# Patient Record
(demographics unavailable — no encounter records)

---

## 2024-10-17 NOTE — HISTORY OF PRESENT ILLNESS
[de-identified] : 64 year old RHD female with PMHx of HTN, presents for initial evaluation, c/o left hip pain x 6 months. Patient reports acute onset of constant left groin pain, radiating to the left hip, described as "aching" and rated 7/10 on the pain scale. Patient states that pain has been persistent, despite cortisone injection x 2 with Dr. Jose Daniel Thomas at [facility]. Patient denies any recent falls, trauma, or other injuries precipitating the onset of her hip pain. Patient denies any numbness, tingling or weakness to LLE. Patient reports history of lumbar stenosis, s/p lumbar nerve block on 07/12/2024 with Dr. Mian Dash. Patient is not participating in PT at this time. Patient otherwise states that she has been in her usual state of health. Patient ambulates without assistance and can perform ADL's independently. Patient denies any fever, chills, headache, dizziness, shortness of breath, chest pain, palpitations, abdominal pain, new onset urinary/bowel incontinence, saddle paresthesia, or other acute complaints at this time.  She did have a left total knee replacement apparently with multiple revisions a number of years ago.

## 2024-10-17 NOTE — PHYSICAL EXAM
[de-identified] : Constitutional o Appearance : well-nourished, well developed, alert, in no acute distress  Head and Face o Head :  Inspection : atraumatic, normocephalic o Face :  Inspection : no visible rash or discoloration Respiratory o Respiratory Effort: breathing unlabored  Neurologic o Mental Status Examination :  Orientation : alert and oriented X 3 Psychiatric o Mood and Affect: mood normal, affect appropriate Cardiovascular o Observation/Palpation : no swelling Lymphatic o Additional Nodes : No palpable lymph nodes present  Lumbosacral Spine o Inspection : no visible rash or discoloration o Palpation : no paraspinal musculature tenderness o Range of Motion : arc of motion full in all planes, no crepitance or pain with ROM o Muscle Strength : paraspinal muscle strength and tone within normal limits o Muscle Tone : paraspinal muscle strength and tone within normal limits o Tests: straight leg test negative bilaterally, BRANDI test negative bilaterally   Right Lower Extremity o Buttock : no tenderness, swelling or deformities o Right Hip :  Inspection/Palpation : no tenderness, no swelling or deformities  Range of Motion : full flexion and external rotation limited no crepitance  Stability : joint stability intact  Strength : extension, flexion, adduction, abduction, internal rotation and external rotation 5/5   Tests: Obers test negative  Left Lower Extremity o Buttock : no tenderness, swelling or deformities  o Left Hip :  Inspection/Palpation : no tenderness, no swelling or deformities  Range of Motion : full flexion and painful and limited external rotation, no crepitance  Stability : joint stability intact  Strength : extension, flexion, adduction, abduction, internal rotation and external rotation 3/5 limited by pain  Tests: Obers test negative  o Left Knee :  Inspection/Palpation : no tenderness to palpation, no swelling  Range of Motion : 0-120active flexion and extension full and painless, no crepitance  Stability : no valgus or varus instability present on provocative testing  Strength : flexion and extension 5/5  Tests and Signs : negative Anterior Drawer, negative Lachman, negative Rupesh's test   Gait: abductor lurch on the left, limited core strength, no significant extremity swelling or lymphedema, good proprioception and balance  Radiology Results 10/17/2024 o Hip and Pelvis : AP pelvis was obtained and reveals moderate to severe degenerative arthritis left side worse than right

## 2024-10-17 NOTE — DISCUSSION/SUMMARY
[de-identified] : I went over the pathophysiology of the patient's symptoms in great detail with the patient. I discussed the underlying pathophysiology of the patient's condition in great detail with the patient. I went over the patient's x-rays with them in great detail. I informed the patient that surgery, a total hip replacement, will be required to provide them long term relief from their symptoms. Patient understands she needs to consider hip replacement given she is losing motion and conservative measures are not providing enough relief. I informed her that I no longer perform hip replacements, and she was provided with the names of my partners Dr. Watters and Dr. Allen. At this time, she will start a course of physical therapy for strengthening and flexibility. A prescription was provided. I am referring the patient to a Radiologist specialist in order to provide her with longer term relief. I informed the patient that a left intra articular injection will be required to provide them longer term relief from their symptoms. We had a lengthy discussion about the patient's issues and talked about the benefits and risks of the injections. A referral was provided. Proper cane walking protocol was discussed and demonstrated with the patient.   All of their questions were answered. They understand and consent to the plan.   FU two weeks after the injections.

## 2024-10-17 NOTE — ADDENDUM
[FreeTextEntry1] : I, ANGE MCLEOD, acted solely as a scribe for Dr. Joey Ley on this date 10/17/2024.  All medical record entries made by the Scribe were at my, Dr. Joey Ley, direction and personally dictated by me on 10/17/2024. I have reviewed the chart and agree that the record accurately reflects my personal performance of the history, physical exam, assessment and plan. I have also personally directed, reviewed, and agreed with the chart.

## 2024-11-14 NOTE — PHYSICAL EXAM
[de-identified] : Constitutional o Appearance : well-nourished, well developed, alert, in no acute distress  Head and Face o Head :  Inspection : atraumatic, normocephalic o Face :  Inspection : no visible rash or discoloration Respiratory o Respiratory Effort: breathing unlabored  Neurologic o Mental Status Examination :  Orientation : alert and oriented X 3 Psychiatric o Mood and Affect: mood normal, affect appropriate Cardiovascular o Observation/Palpation : no swelling Lymphatic o Additional Nodes : No palpable lymph nodes present  Lumbosacral Spine o Inspection : no visible rash or discoloration o Palpation : no paraspinal musculature tenderness o Range of Motion : arc of motion full in all planes, no crepitance or pain with ROM o Muscle Strength : paraspinal muscle strength and tone within normal limits o Muscle Tone : paraspinal muscle strength and tone within normal limits o Tests: straight leg test negative bilaterally, BRANDI test negative bilaterally   Right Lower Extremity o Buttock : no tenderness, swelling or deformities o Right Hip :  Inspection/Palpation : no tenderness, no swelling or deformities  Range of Motion : full flexion and external rotation limited no crepitance  Stability : joint stability intact  Strength : extension, flexion, adduction, abduction, internal rotation and external rotation 5/5   Tests: Obers test negative  Left Lower Extremity o Buttock : no tenderness, swelling or deformities  o Left Hip :  Inspection/Palpation : no tenderness, no swelling or deformities  Range of Motion : full flexion and painful and limited external rotation, no crepitance  Stability : joint stability intact  Strength : extension, flexion, adduction, abduction, internal rotation and external rotation 3/5 limited by pain  Tests: Obers test negative  o Left Knee :  Inspection/Palpation : no tenderness to palpation, no swelling  Range of Motion : 0-120active flexion and extension full and painless, no crepitance  Stability : no valgus or varus instability present on provocative testing  Strength : flexion and extension 3/5  Tests and Signs : negative Anterior Drawer, negative Lachman, negative Rupesh's test   Gait: abductor lurch on the left, limited core strength, no significant extremity swelling or lymphedema, good proprioception and balance

## 2024-11-14 NOTE — ADDENDUM
[FreeTextEntry1] : I, ANGE MCLEOD, acted solely as a scribe for Dr. Joey Ley on this date 11/14/2024.  All medical record entries made by the Scribe were at my, Dr. Joey Ley, direction and personally dictated by me on 11/14/2024. I have reviewed the chart and agree that the record accurately reflects my personal performance of the history, physical exam, assessment and plan. I have also personally directed, reviewed, and agreed with the chart.

## 2024-11-14 NOTE — DISCUSSION/SUMMARY
[de-identified] : I went over the pathophysiology of the patient's symptoms in great detail with the patient. I discussed the underlying pathophysiology of the patient's condition in great detail with the patient. I went over the patient's x-rays with them in great detail. We are requesting authorization for an MRI of the patients left hip. I am recommending the patient continues to go to physical therapy to obtain increases in their strength and mobility. A prescription was provided. Proper cane walking protocol was discussed and demonstrated with the patient.   All of their questions were answered. They understand and consent to the plan.   FU after MRI.

## 2024-11-14 NOTE — HISTORY OF PRESENT ILLNESS
[de-identified] : 64 year old RHD female with PMHx of HTN, presents for a follow-up evaluation, c/o left hip pain x 6 months. She had a left hip intra articular injection 2-3 weeks and reports relief the first day. She states she started physical therapy on 10/21/2024 and notes she has good and bad days. She does not have a recent MRI of her left hip. Patient denies that she has any numbness or tingling. She states she was given at home exercises as well. She denies having problems putting socks and shoes on the right side. Patient presents ambulating with a cane in the right hand.   PROCEDURE DATE:  10/21/2024 INTERPRETATION:  Fluoroscopic -guided left hip joint injection  History: Left hip pain. IMPRESSION:  Successful fluoroscopic-guided therapeutic injection of the left hip as described above.  --- End of Report ---  Radiology Results 10/17/2024 o Hip and Pelvis : AP pelvis was obtained and reveals moderate to severe degenerative arthritis left side worse than right

## 2024-12-04 NOTE — DISCUSSION/SUMMARY
[de-identified] : I went over the pathophysiology of the patient's symptoms in great detail with the patient. I went over the patient's MRI results with them in great detail and used models to aid in my explanation. Proper cane walking protocol was discussed and demonstrated with the patient. I informed the patient that surgery, a total hip replacement, will be required to provide them long term relief from their symptoms. Patient understands she needs to consider hip replacement given she is losing motion and conservative measures are not providing enough relief. At-home strengthening exercises were discussed and demonstrated with the patient. She will stop physical therapy at this time.  We discussed the use of Voltaren gel at this time. Instructions were discussed with the patient. We are prescribing Diclofenac at this time. A prescription was provided.  A long discussion about a total hip replacement was undertaken.  She does understand at some point she will need a left hip replacement and she is in the appropriate age.  All of their questions were answered. They understand and consent to the plan.   FU in 6 weeks.

## 2024-12-04 NOTE — HISTORY OF PRESENT ILLNESS
[de-identified] : 64 year old RHD female with PMHx of HTN, presents for a left hip MRI review today. I went over the patient's MRI results with them in great detail and used models to aid in my explanation. She had a left hip intra articular injection a month ago and reports relief the first day. and the pain has returned.  She states she started physical therapy on 10/21/2024 and notes she has good and bad days. Patient denies that she has any numbness or tingling. She states the average discomfort is a 6 out of 10. Patient presents ambulating with a cane in the right hand. She states she is able to walk. She states she is taking Aspirin for the pain.   [facility] MRI LEFT HIP done on 11/20/2024 IMPRESSION: 1.  Advanced left hip arthrosis most notably superiorly and anterior superiorly. 2.  Small hip joint effusion and mild trochanteric bursitis.  --- End of Report ---   PROCEDURE DATE:  10/21/2024 INTERPRETATION:  Fluoroscopic -guided left hip joint injection  History: Left hip pain. IMPRESSION:  Successful fluoroscopic-guided therapeutic injection of the left hip as described above.  --- End of Report ---  Radiology Results 10/17/2024 o Hip and Pelvis : AP pelvis was obtained and reveals moderate to severe degenerative arthritis left side worse than right

## 2024-12-04 NOTE — PHYSICAL EXAM
[de-identified] : Constitutional o Appearance : well-nourished, well developed, alert, in no acute distress  Head and Face o Head :  Inspection : atraumatic, normocephalic o Face :  Inspection : no visible rash or discoloration Respiratory o Respiratory Effort: breathing unlabored  Neurologic o Mental Status Examination :  Orientation : alert and oriented X 3 Psychiatric o Mood and Affect: mood normal, affect appropriate Cardiovascular o Observation/Palpation : no swelling Lymphatic o Additional Nodes : No palpable lymph nodes present  Lumbosacral Spine o Inspection : no visible rash or discoloration o Palpation : no paraspinal musculature tenderness o Range of Motion : arc of motion full in all planes, no crepitance or pain with ROM o Muscle Strength : paraspinal muscle strength and tone within normal limits o Muscle Tone : paraspinal muscle strength and tone within normal limits o Tests: straight leg test negative bilaterally, BRANDI test negative bilaterally   Right Lower Extremity o Buttock : no tenderness, swelling or deformities o Right Hip :  Inspection/Palpation : no tenderness, no swelling or deformities  Range of Motion : full flexion and external rotation limited no crepitance  Stability : joint stability intact  Strength : extension, flexion, adduction, abduction, internal rotation and external rotation 5/5   Tests: Obers test negative  Left Lower Extremity o Buttock : no tenderness, swelling or deformities  o Left Hip :  Inspection/Palpation : no tenderness, no swelling or deformities  Range of Motion : full flexion and painful and limited external rotation, no crepitance  Stability : joint stability intact  Strength : extension, flexion, adduction, abduction, internal rotation and external rotation 3/5 limited by pain  Tests: Obers test negative  o Left Knee :  Inspection/Palpation : no tenderness to palpation, no swelling  Range of Motion : 0-120active flexion and extension full and painless, no crepitance  Stability : no valgus or varus instability present on provocative testing  Strength : flexion and extension 3/5  Tests and Signs : negative Anterior Drawer, negative Lachman, negative Rupesh's test   Gait: minimal  abductor lurch on the left, limited core strength, no significant extremity swelling or lymphedema, good proprioception and balance

## 2024-12-04 NOTE — ADDENDUM
[FreeTextEntry1] : I, ANGE MCLEOD, acted solely as a scribe for Dr. Joey Ley on this date 12/04/2024.  All medical record entries made by the Scribe were at my, Dr. Joey Ley, direction and personally dictated by me on 12/04/2024. I have reviewed the chart and agree that the record accurately reflects my personal performance of the history, physical exam, assessment and plan. I have also personally directed, reviewed, and agreed with the chart.

## 2025-01-21 NOTE — DISCUSSION/SUMMARY
[de-identified] : I went over the pathophysiology of the patient's symptoms in great detail with the patient. I discussed the underlying pathophysiology of the patient's condition in great detail with the patient. I went over the patient's x-rays with them in great detail. At this time, she will start a course of physical therapy for strengthening and flexibility. A prescription was provided. At-home strengthening exercises were discussed and demonstrated with the patient. She should focus on light weight and high repetition exercises. We are prescribing 5% lidocaine patches at this time.   All of their questions were answered. They understand and consent to the plan.   FU in 4-6 weeks.

## 2025-01-21 NOTE — HISTORY OF PRESENT ILLNESS
[de-identified] : 64 year old RHD female with PMHx of HTN, presents for a left hip follow-up evaluation. She had select nerve root blocks and epidurals for her lower back which are not helping anymore. She is getting an intercept procedure for her lower back. She had a left hip intra articular injection a month ago and reports relief the first day. and the pain has returned.  She states she started physical therapy on 10/21/2024 and notes she has good and bad days. Patient denies that she has any numbness or tingling. She states the average discomfort is a 6 out of 10. Patient presents ambulating with a cane in the right hand. She states she is able to walk. She states she is taking Diclofenac to help manage pain. She does have difficultly tying her shoes. She states when her lower back flares up it aggravates her left hip pain. She is not in physical therapy at this time.    [facility] MRI LEFT HIP done on 11/20/2024 IMPRESSION: 1.  Advanced left hip arthrosis most notably superiorly and anterior superiorly. 2.  Small hip joint effusion and mild trochanteric bursitis.  --- End of Report ---   PROCEDURE DATE:  10/21/2024 INTERPRETATION:  Fluoroscopic -guided left hip joint injection  History: Left hip pain. IMPRESSION:  Successful fluoroscopic-guided therapeutic injection of the left hip as described above.  --- End of Report ---  Radiology Results 10/17/2024 o Hip and Pelvis : AP pelvis was obtained and reveals moderate to severe degenerative arthritis left side worse than right

## 2025-01-21 NOTE — ADDENDUM
[FreeTextEntry1] : I, ANGE MCLEOD, acted solely as a scribe for Dr. Joey Ley on this date 01/21/2025.  All medical record entries made by the Scribe were at my, Dr. Joey Ley, direction and personally dictated by me on 01/21/2025. I have reviewed the chart and agree that the record accurately reflects my personal performance of the history, physical exam, assessment and plan. I have also personally directed, reviewed, and agreed with the chart.

## 2025-03-13 NOTE — HISTORY OF PRESENT ILLNESS
[FreeTextEntry1] : 64 W who is here for initial consultation of new onset headache. It started 1 month ago when she started taking a supplement called Moringa. A quick search on pubmed shows that it may decrease blood pressure. Her blood pressure now is not hypotensive but she has the headache more when she is in bed at night.  quality: pressure location: right and front of her head frequency nightly and upon awakening duration all night.  Her Mimvey was started for her hot flashes and it may cause headaches as per [email or web address]. If the headache continues, she will consult with her gyn on possibly changing this to something else.

## 2025-03-13 NOTE — DISCUSSION/SUMMARY
[FreeTextEntry1] : 64 W who is here for initial consultation of new onset headache that occurs at night and morning. Will check mri of brain to evaluate for any structural causes of her headache. She will hold Moringa as the start of this supplement coincides with her headache onset. If it continues, the mimvey may also be causing headache as a side effect. She will keep me posted and will followup after the mri is completed.  I spent the time noted on the day of this patient encounter preparing for, review of medical records,review of pertinent diagnostic studies, providing and documenting the above E/M service and counseling and educate patient on differential, workup, disease course, and treatment/management. Education was provided to the patient during this encounter. All questions and concerns were answered and addressed in detail. The patient verbalized understanding and agreed to plan. Patient was advised to continue to monitor for neurologic symptoms and to notify my office or go to the nearest emergency room if there are any changes. Any orders/referrals and communications were provided as well. Side effects of the above medications were discussed in detail including but not limited to applicable black box warning and teratogenicity as appropriate. Patient was advised to bring previous records to my office. A copy of the consult note will be sent to the referring physician.

## 2025-05-13 NOTE — HISTORY OF PRESENT ILLNESS
[de-identified] : 64 year old RHD female with PMHx of HTN, presents for a left hip follow-up evaluation. She had selected nerve root blocks and epidurals for her lower back which are not helping anymore. She had a left hip intra articular injection a month ago and reports relief the first day and the pain did return.  She states she started physical therapy on 10/21/2024 and notes she has improved her strength. Patient denies that she has any numbness or tingling. Patient presents ambulating with a cane in the right hand. She states she is able to walk. She states she is taking Diclofenac to help manage pain but notes it's no longer working. She does have difficultly tying her shoes. She states she is miserable and is ready for surgery. She continues to have difficulty putting socks and shoes.   [facility] MRI LEFT HIP done on 11/20/2024 IMPRESSION: 1.  Advanced left hip arthrosis most notably superiorly and anterior superiorly. 2.  Small hip joint effusion and mild trochanteric bursitis.  --- End of Report ---   PROCEDURE DATE:  10/21/2024 INTERPRETATION:  Fluoroscopic -guided left hip joint injection  History: Left hip pain. IMPRESSION:  Successful fluoroscopic-guided therapeutic injection of the left hip as described above.  --- End of Report ---  Radiology Results 10/17/2024 o Hip and Pelvis : AP pelvis was obtained and reveals moderate to severe degenerative arthritis left side worse than right

## 2025-05-13 NOTE — ADDENDUM
[FreeTextEntry1] : I, ANGE MCLEOD, acted solely as a scribe for Dr. Joey Ley on this date 05/13/2025.  All medical record entries made by the Scribe were at my, Dr. Joey Ley, direction and personally dictated by me on 05/13/2025. I have reviewed the chart and agree that the record accurately reflects my personal performance of the history, physical exam, assessment and plan. I have also personally directed, reviewed, and agreed with the chart.

## 2025-05-13 NOTE — DISCUSSION/SUMMARY
[de-identified] : I went over the pathophysiology of the patient's symptoms in great detail with the patient. I informed the patient that surgery, a total hip replacement, will be required to provide them long term relief from their symptoms. Patient understands she needs to consider hip replacement given she is losing motion and conservative measures are not providing enough relief. I informed her that I no longer perform hip replacements, and she was provided with the names of my partners Dr. Watters, Dr. Brito and Dr. Allen.   All of their questions were answered. They understand and consent to the plan.   FU with Dr. Watters, Dr. Brito or Dr. Allen

## 2025-05-13 NOTE — PHYSICAL EXAM
[de-identified] : Constitutional o Appearance : well-nourished, well developed, alert, in no acute distress  Head and Face o Head :  Inspection : atraumatic, normocephalic o Face :  Inspection : no visible rash or discoloration Respiratory o Respiratory Effort: breathing unlabored  Neurologic o Mental Status Examination :  Orientation : alert and oriented X 3 Psychiatric o Mood and Affect: mood normal, affect appropriate Cardiovascular o Observation/Palpation : no swelling Lymphatic o Additional Nodes : No palpable lymph nodes present  Lumbosacral Spine o Inspection : no visible rash or discoloration o Palpation : no paraspinal musculature tenderness o Range of Motion : arc of motion full in all planes, no crepitance or pain with ROM o Muscle Strength : paraspinal muscle strength and tone within normal limits o Muscle Tone : paraspinal muscle strength and tone within normal limits o Tests: straight leg test negative bilaterally, BRANDI test negative bilaterally   Right Lower Extremity o Buttock : no tenderness, swelling or deformities o Right Hip :  Inspection/Palpation : no tenderness, no swelling or deformities  Range of Motion : full flexion and limited external rotation limited no crepitance  Stability : joint stability intact  Strength : extension, flexion, adduction, abduction, internal rotation and external rotation 5/5   Tests: Obers test negative  Left Lower Extremity o Buttock : no tenderness, swelling or deformities  o Left Hip :  Inspection/Palpation : no tenderness, no swelling or deformities  Range of Motion : full flexion and painful and very limited rotation, no crepitance  Stability : joint stability intact  Strength : extension, flexion, adduction, abduction, internal rotation and external rotation 5/5   Tests: Obers test negative  o Left Knee :  Inspection/Palpation : no tenderness to palpation, no swelling  Range of Motion : 0-120active flexion and extension full and painless, no crepitance  Stability : no valgus or varus instability present on provocative testing  Strength : flexion and extension 5/5  Tests and Signs : negative Anterior Drawer, negative Lachman, negative Rupesh's test   Gait: mild  abductor lurch on the bilaterally , limited core strength, no foot drop, leg lengths are equal, reasonable good core strength, no significant extremity swelling or lymphedema, good proprioception and balance